# Patient Record
Sex: MALE | NOT HISPANIC OR LATINO | Employment: FULL TIME | URBAN - METROPOLITAN AREA
[De-identification: names, ages, dates, MRNs, and addresses within clinical notes are randomized per-mention and may not be internally consistent; named-entity substitution may affect disease eponyms.]

---

## 2019-11-27 ENCOUNTER — NURSE TRIAGE (OUTPATIENT)
Dept: PHYSICAL THERAPY | Facility: OTHER | Age: 34
End: 2019-11-27

## 2019-11-27 ENCOUNTER — TRANSCRIBE ORDERS (OUTPATIENT)
Dept: PHYSICAL THERAPY | Facility: MEDICAL CENTER | Age: 34
End: 2019-11-27

## 2019-11-27 ENCOUNTER — EVALUATION (OUTPATIENT)
Dept: PHYSICAL THERAPY | Facility: MEDICAL CENTER | Age: 34
End: 2019-11-27
Payer: COMMERCIAL

## 2019-11-27 ENCOUNTER — AMB VIDEO VISIT (OUTPATIENT)
Dept: OTHER | Facility: HOSPITAL | Age: 34
End: 2019-11-27

## 2019-11-27 DIAGNOSIS — M54.50 ACUTE MIDLINE LOW BACK PAIN WITHOUT SCIATICA: Primary | ICD-10-CM

## 2019-11-27 DIAGNOSIS — S39.012A STRAIN OF MUSCLE, FASCIA AND TENDON OF LOWER BACK, INITIAL ENCOUNTER: Primary | ICD-10-CM

## 2019-11-27 PROCEDURE — 97161 PT EVAL LOW COMPLEX 20 MIN: CPT | Performed by: PHYSICAL THERAPIST

## 2019-11-27 PROCEDURE — EVISIT: Performed by: PHYSICIAN ASSISTANT

## 2019-11-27 RX ORDER — METHOCARBAMOL 750 MG/1
750 TABLET, FILM COATED ORAL EVERY 6 HOURS PRN
Qty: 15 TABLET | Refills: 0 | Status: SHIPPED | OUTPATIENT
Start: 2019-11-27

## 2019-11-27 RX ORDER — PREDNISONE 50 MG/1
50 TABLET ORAL DAILY
Qty: 5 TABLET | Refills: 0 | Status: SHIPPED | OUTPATIENT
Start: 2019-11-27 | End: 2019-12-02

## 2019-11-27 NOTE — LETTER
2019    Stuart Cruz DO  [address] [facility] [id number]    Patient: Mateus Delgado  YOB: 1985   Date of Visit: 2019      Dear Dr Lnudberg Members:    One of your patients, Mateus Delgado, was referred to me by the [facility] Comprehensive Spine program   Please see the evaluation summary attached below  If care is required beyond 30 days to help your patient reach their goals, I will send you a request for signature on the plan of care  If you have any questions or concerns, please don't hesitate to call  Sincerely,    Radha Mobley, PT      Primary Care Provider:      I certify that I have read the below Plan of Care and certify the need for these services furnished under this plan of treatment while under my care  Stuart Cruz DO  [address] [facility] [place]  [address]  [id number]  VIA Facsimile: [phone]           PT Evaluation     Today's date: 19  Patient name: Mateus Delgado  : 1985  MRN: [id number]  Referring provider: Vandy Runner, MD  Dx:   Encounter Diagnosis     ICD-10-CM    1  Acute midline low back pain without sciatica M54 5 Ambulatory referral to PT spine                  Assessment  Assessment details: Mateus Delgado is a 30 y o male presenting through comprehensive spine program for evaluation of acute onset of low back pain  No red flags present at this time and no referral neccessjose luis Miller presents with acute muscular spasm resulting in severe activity limitations and compensatory movements  Symptom irritability high, Care anywhere utilized to obtain steroid taper and muscle relaxer for patient as well as home program for general activity and mobility exercises  Skilled therapy indicated to address functional limitations and return to maximal function     Impairments: abnormal muscle firing, abnormal muscle tone, abnormal or restricted ROM, impaired physical strength, lacks appropriate home exercise program and pain with function  Understanding of Dx/Px/POC: good   Prognosis: good    Goals  Patient will successfully transition to home exercise program   Patient will be able to manage symptoms independently  Patient will report no limitation in driving  Patient will report no limitation in work activity  Plan  Patient would benefit from: skilled PT  Referral necessary: No  Planned modality interventions: thermotherapy: hydrocollator packs  Planned therapy interventions: home exercise program, manual therapy, neuromuscular re-education, patient education, functional ROM exercises, strengthening, stretching, joint mobilization, graded activity, graded exercise, therapeutic exercise, body mechanics training, motor coordination training and activity modification  Frequency: 1x week  Duration in weeks: 12  Treatment plan discussed with: patient        Subjective Evaluation    History of Present Illness  Mechanism of injury: Helen Cruz is a 30 y o  male presenting to therapy with complaints of acute onset of LBP while lifting a box  He noted this event occurred at 12PM today and pain has gotten worse  He had instances in the past like this but this episode seems to be the worst yet  Pain localized to lumbar spine and makes it difficult to sit, stand, or attempt to extend back  He notes pain is 8/10  No bowel or bladder dysfunction, no fever/chills, no balance disturbances  He comes for evaluation through comp spine program    Pain  Current pain ratin  At best pain ratin  At worst pain ratin  Quality: dull ache, sharp, pressure and squeezing          Objective   Red flag screening (-)   Dermatomes/myotomes intact  Reflexes 2+ patellar and achilles  Standing posture forward flexed and guarded  AROM: Severe guarding and poor core control due to guarding and muscle spasm      Palpation:  Tender to musculature, no focal tenderness over spinous process  Further exam limited due to symptom irritability              Precautions: None      Manual  11/27            Lumbar flexion rotation HVLAT AF                                                                    Exercise Diary              LTR                                                                                                                                                                                                                                                                        Modalities

## 2019-11-27 NOTE — PATIENT INSTRUCTIONS
Low Back Strain   AMBULATORY CARE:   Low back strain  is an injury to your lower back muscles or tendons  Tendons are strong tissues that connect muscles to bones  The lower back supports most of your body weight and helps you move, twist, and bend  Low back strain is usually caused by activities that increase stress on the lower back, such as exercise or injury  Common signs and symptoms include the following:   · Low back pain or muscle spasms    · Stiffness or limited movement    · Pain that goes down to the buttocks, groin, or legs    · Pain that is worse with activity  Seek care immediately if:   · You hear or feel a pop in your lower back  · You have increased swelling or pain in your lower back  · You have trouble moving your legs  · Your legs are numb  Contact your healthcare provider if:   · You have a fever  · Your pain does not go away, even after treatment  · You have questions or concerns about your condition or care  Treatment for low back strain:   · Acetaminophen decreases pain and fever  It is available without a doctor's order  Ask how much to take and how often to take it  Follow directions  Acetaminophen can cause liver damage if not taken correctly  · NSAIDs , such as ibuprofen, help decrease swelling, pain, and fever  This medicine is available with or without a doctor's order  NSAIDs can cause stomach bleeding or kidney problems in certain people  If you take blood thinner medicine, always ask your healthcare provider if NSAIDs are safe for you  Always read the medicine label and follow directions  · Muscle relaxers  help decrease pain and muscle spasms  · Prescription pain medicine  may be given  Ask how to take this medicine safely  · Surgery  may be needed if your strain is severe  Manage your symptoms:   · Rest  as directed  You may need to rest in bed for a period of time after your injury  Do not lift heavy objects       · Apply ice  on your back for 15 to 20 minutes every hour or as directed  Use an ice pack, or put crushed ice in a plastic bag  Cover it with a towel  Ice helps prevent tissue damage and decreases swelling and pain  · Apply heat  on your lower back for 20 to 30 minutes every 2 hours for as many days as directed  Heat helps decrease pain and muscle spasms  · Slowly start to increase your activity  as the pain decreases, or as directed  Prevent another low back strain:   · Use correct body movements  ¨ Bend at the hips and knees when you  objects  Do not bend from the waist  Use your leg muscles as you lift the load  Do not use your back  Keep the object close to your chest as you lift it  Try not to twist or lift anything above your waist     ¨ Change your position often when you stand for long periods of time  Rest one foot on a small box or footrest, and then switch to the other foot often  ¨ Try not to sit for long periods of time  When you do, sit in a straight-backed chair with your feet flat on the floor  ¨ Never reach, pull, or push while you are sitting  · Warm up before you exercise  Do exercises that strengthen your back muscles  Ask your healthcare provider about the best exercise plan for you  · Maintain a healthy weight  Ask your healthcare provider how much you should weigh  Ask him to help you create a weight loss plan if you are overweight  Follow up with your healthcare provider as directed:  Write down your questions so you remember to ask them during your visits  © 2017 [employer] Information is for End User's use only and may not be sold, redistributed or otherwise used for commercial purposes  All illustrations and images included in CareNotes® are the copyrighted property of A [employer]  or [employer]enny W[employer]  The above information is an  only  It is not intended as medical advice for individual conditions or treatments   Talk to your doctor, nurse or pharmacist before following any medical regimen to see if it is safe and effective for you

## 2019-11-27 NOTE — PROGRESS NOTES
[facility]        NAME: Herbie Maynard is a 30 y o  male  : 1985    MRN: [id number]  DATE: 2019  TIME: 5:15 PM    Assessment and Plan   Strain of muscle, fascia and tendon of lower back, initial encounter [S39 012A]  1  Strain of muscle, fascia and tendon of lower back, initial encounter  predniSONE 50 mg tablet    methocarbamol (ROBAXIN) 750 mg tablet         Patient Instructions       Follow up with PCP in 3-5 days  Proceed to  ER if symptoms worsen  Chief Complaint   No chief complaint on file  History of Present Illness       72-year-old male presents for telemedicine visit with his physical therapist   Patient reports today he was acting something up and developed acute low back pain  PT was treatment for back issues and came in today to be evaluated and worked on for his low back pain  Physical therapist reports that he is having a lot of spasm in lower back  Denies any bowel or bladder incontinence  No abdominal pain nausea vomiting or diarrhea  Denies any fevers or chills  No chest pain shortness of breath  No radicular pain to the legs  Back Pain   This is a new problem  The current episode started today  The problem occurs constantly  The problem has been waxing and waning since onset  The pain is present in the lumbar spine  The quality of the pain is described as stabbing and shooting  The pain does not radiate  The pain is severe  The pain is the same all the time  The symptoms are aggravated by bending and twisting  Stiffness is present all day  Pertinent negatives include no abdominal pain, bladder incontinence, bowel incontinence, chest pain, dysuria, numbness, paresthesias, tingling or weakness  Risk factors include history of cancer  He has tried nothing for the symptoms  The treatment provided no relief  Review of Systems   Review of Systems   Constitutional: Negative  HENT: Negative  Eyes: Negative  Respiratory: Negative  Cardiovascular: Negative  Negative for chest pain  Gastrointestinal: Negative  Negative for abdominal pain and bowel incontinence  Genitourinary: Negative for bladder incontinence and dysuria  Musculoskeletal: Positive for back pain  Skin: Negative  Neurological: Negative  Negative for tingling, weakness, numbness and paresthesias  Current Medications       Current Outpatient Medications:     methocarbamol (ROBAXIN) 750 mg tablet, Take 1 tablet (750 mg total) by mouth every 6 (six) hours as needed for muscle spasms, Disp: 15 tablet, Rfl: 0    predniSONE 50 mg tablet, Take 1 tablet (50 mg total) by mouth daily for 5 days, Disp: 5 tablet, Rfl: 0    Current Allergies     Allergies as of 11/27/2019    (Not on File)            The following portions of the patient's history were reviewed and updated as appropriate: allergies, current medications, past family history, past medical history, past social history, past surgical history and problem list      No past medical history on file  No past surgical history on file  No family history on file  Medications have been verified  Objective   There were no vitals taken for this visit  Physical Exam     Physical Exam   Constitutional: He is oriented to person, place, and time  He appears well-developed and well-nourished  No distress  HENT:   Head: Normocephalic and atraumatic  Head is without abrasion and without contusion  Right Ear: External ear normal    Left Ear: External ear normal    Nose: Nose normal  No nasal deformity  Mouth/Throat: Oropharynx is clear and moist    Eyes: Conjunctivae, EOM and lids are normal    Neck: Normal range of motion and full passive range of motion without pain  Pulmonary/Chest: Effort normal  No respiratory distress  Neurological: He is alert and oriented to person, place, and time  Skin: He is not diaphoretic  Psychiatric: He has a normal mood and affect   His speech is normal and behavior is normal  Judgment and thought content normal  Cognition and memory are normal

## 2019-11-27 NOTE — TELEPHONE ENCOUNTER
Background - Initial Assessment  Clinical complaint: Acute mid lower back pain non radiating  Patient states that he has had mild back pain in the past that has gone away after a day or 2  Patient states this pain is much more severe  Date of onset: 11/27  Frequency of pain: intermittent  Quality of pain: sharp and stabbing    Protocols used: [facility] COMPREHENSIVE SPINE PROGRAM PROTOCOL    This RN did review in detail the Comprehensive Spine Program and what we can provide for their back pain  Patient is agreeable to being triaged by this RN and would like to proceed with Physical Therapy  Referral was placed for Physical Therapy at the [facility] site  Patients information was sent to the  to make evaluation appointment  Patient informed that the PT office will be calling him to schedule his evaluation    No further questions and/or concerns were voiced by the patient at this time  Patient states understanding of the referral that was placed  Volodymyr Silva

## 2019-11-29 VITALS — SYSTOLIC BLOOD PRESSURE: 124 MMHG | TEMPERATURE: 98.7 F | DIASTOLIC BLOOD PRESSURE: 76 MMHG

## 2019-11-29 NOTE — PROGRESS NOTES
PT Evaluation     Today's date: 19  Patient name: Helen Cruz  : 1985  MRN: [id number]  Referring provider: Augusta Rosenbaum MD  Dx:   Encounter Diagnosis     ICD-10-CM    1  Acute midline low back pain without sciatica M54 5 Ambulatory referral to PT spine                  Assessment  Assessment details: Helen Cruz is a 30 y o male presenting through comprehensive spine program for evaluation of acute onset of low back pain  No red flags present at this time and no referral neccessary  Jaky Claudio presents with acute muscular spasm resulting in severe activity limitations and compensatory movements  Symptom irritability high, Care anywhere utilized to obtain steroid taper and muscle relaxer for patient as well as home program for general activity and mobility exercises  Skilled therapy indicated to address functional limitations and return to maximal function  Impairments: abnormal muscle firing, abnormal muscle tone, abnormal or restricted ROM, impaired physical strength, lacks appropriate home exercise program and pain with function  Understanding of Dx/Px/POC: good   Prognosis: good    Goals  Patient will successfully transition to home exercise program   Patient will be able to manage symptoms independently  Patient will report no limitation in driving  Patient will report no limitation in work activity          Plan  Patient would benefit from: skilled PT  Referral necessary: No  Planned modality interventions: thermotherapy: hydrocollator packs  Planned therapy interventions: home exercise program, manual therapy, neuromuscular re-education, patient education, functional ROM exercises, strengthening, stretching, joint mobilization, graded activity, graded exercise, therapeutic exercise, body mechanics training, motor coordination training and activity modification  Frequency: 1x week  Duration in weeks: 12  Treatment plan discussed with: patient        Subjective Evaluation    History of Present Illness  Mechanism of injury: Zari Ramirez is a 30 y o  male presenting to therapy with complaints of acute onset of LBP while lifting a box  He noted this event occurred at 12PM today and pain has gotten worse  He had instances in the past like this but this episode seems to be the worst yet  Pain localized to lumbar spine and makes it difficult to sit, stand, or attempt to extend back  He notes pain is 8/10  No bowel or bladder dysfunction, no fever/chills, no balance disturbances  He comes for evaluation through comp spine program    Pain  Current pain ratin  At best pain ratin  At worst pain ratin  Quality: dull ache, sharp, pressure and squeezing          Objective   Red flag screening (-)   Dermatomes/myotomes intact  Reflexes 2+ patellar and achilles  Standing posture forward flexed and guarded  AROM: Severe guarding and poor core control due to guarding and muscle spasm  Palpation:  Tender to musculature, no focal tenderness over spinous process  Further exam limited due to symptom irritability              Precautions: None      Manual              Lumbar flexion rotation HVLAT AF                                                                    Exercise Diary              LTR                                                                                                                                                                                                                                                                        Modalities

## 2019-12-03 ENCOUNTER — OFFICE VISIT (OUTPATIENT)
Dept: PHYSICAL THERAPY | Facility: MEDICAL CENTER | Age: 34
End: 2019-12-03
Payer: COMMERCIAL

## 2019-12-03 DIAGNOSIS — M54.50 ACUTE MIDLINE LOW BACK PAIN WITHOUT SCIATICA: Primary | ICD-10-CM

## 2019-12-03 PROCEDURE — 97110 THERAPEUTIC EXERCISES: CPT | Performed by: PHYSICAL THERAPIST

## 2019-12-04 NOTE — PROGRESS NOTES
Daily Note     Today's date: 2019  Patient name: Na Nice  : 1985  MRN: [id number]  Referring provider: Jaja Hewitt MD  Dx:   Encounter Diagnosis     ICD-10-CM    1  Acute midline low back pain without sciatica M54 5                   Subjective: Fabiana Padilla comes to therapy with significant improvement from last time  Began steroid that day and felt better over next few days  Returned to work with fair tolerance so far       Objective: See treatment diary below      Assessment: Tolerated treatment well  Patient instructed in mobility drills to begin introducing lumbar and thoracic spine to movement again  Added spinal neutral stability holds as well  Will check in in 2 weeks if needed      Plan: Continue per plan of care        Precautions: None      Manual  11/27 12/3           Lumbar flexion rotation HVLAT AF                                                                    Exercise Diary              LTR             Mod plank  20 sec  X5           Prashant pose  10 sec  X10           Dead bug  5X2                                                                                                                                                                                                                               Modalities

## 2019-12-16 ENCOUNTER — APPOINTMENT (OUTPATIENT)
Dept: PHYSICAL THERAPY | Facility: MEDICAL CENTER | Age: 34
End: 2019-12-16
Payer: COMMERCIAL